# Patient Record
Sex: MALE | Race: BLACK OR AFRICAN AMERICAN | URBAN - METROPOLITAN AREA
[De-identification: names, ages, dates, MRNs, and addresses within clinical notes are randomized per-mention and may not be internally consistent; named-entity substitution may affect disease eponyms.]

---

## 2024-01-08 ENCOUNTER — OFFICE VISIT (OUTPATIENT)
Age: 39
End: 2024-01-08

## 2024-01-08 VITALS
HEART RATE: 73 BPM | HEIGHT: 72 IN | TEMPERATURE: 97.1 F | WEIGHT: 186 LBS | BODY MASS INDEX: 25.19 KG/M2 | SYSTOLIC BLOOD PRESSURE: 130 MMHG | DIASTOLIC BLOOD PRESSURE: 70 MMHG | OXYGEN SATURATION: 98 %

## 2024-01-08 DIAGNOSIS — M79.651 PAIN OF RIGHT THIGH: Primary | ICD-10-CM

## 2024-01-08 PROCEDURE — 99204 OFFICE O/P NEW MOD 45 MIN: CPT | Performed by: PSYCHIATRY & NEUROLOGY

## 2024-01-08 NOTE — PROGRESS NOTES
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    HPI:     For the past 5 months, patient noted pain on the R posterior thigh when bending over, comes and goes. Slowly getting better.    (-) lower back pain  (-) weakness  (-) numbness  (-) incontinence        Review of Systems  (-) fever  (-) rash  All other systems reviewed and are negative    PMH  No past medical history on file.    Social Hx  Social History     Socioeconomic History    Marital status: Single   (+) smoking  Social drinker    Family Hx  No family history on file.  Cancer - rectal - father    ALLERGIES  No Known Allergies    CURRENT MEDS  No current outpatient medications on file.     No current facility-administered medications for this visit.           PREVIOUS WORKUP  IMAGING:    CT Results (recent):  @Hybrid SecuritySTIMGCAT(HCS9396:1)@    MRI Results (recent):  @Hybrid SecuritySTIMGCAT(QHT3822:1)@    IR Results (recent):  @BSIntervalZeroSTIMGCAT(VJM3081:1)@    VAS/US Results (recent):  @BSHSILASTIMGCAT(OSE8844:1)@    (I personally reviewed these images in PACS and this is my impression)    LABS (reviewed)    No visits with results within 3 Month(s) from this visit.   Latest known visit with results is:   No results found for any previous visit.       Physical Exam:   /70   Pulse 73   Temp 97.1 °F (36.2 °C)   Ht 1.829 m (6')   Wt 84.4 kg (186 lb)   SpO2 98%   BMI 25.23 kg/m²   General:  Alert, cooperative, no distress.   Head:  Normocephalic, without obvious abnormality, atraumatic.   Eyes:  Conjunctivae/corneas clear. Pupils equal, round, reactive to light. Extraocular movements intact, VFF, NO papilledema   Lungs:  Heart:   Non labored breathing  Regular rate and rhythm, no carotid bruits   Abdomen:   Soft, non-distended   Extremities: Extremities normal, atraumatic, no cyanosis or